# Patient Record
Sex: MALE | Race: WHITE
[De-identification: names, ages, dates, MRNs, and addresses within clinical notes are randomized per-mention and may not be internally consistent; named-entity substitution may affect disease eponyms.]

---

## 2020-11-09 ENCOUNTER — HOSPITAL ENCOUNTER (OUTPATIENT)
Dept: HOSPITAL 92 - BICULT | Age: 35
Discharge: HOME | End: 2020-11-09
Attending: NURSE PRACTITIONER
Payer: COMMERCIAL

## 2020-11-09 DIAGNOSIS — R07.0: Primary | ICD-10-CM

## 2020-11-09 PROCEDURE — 76536 US EXAM OF HEAD AND NECK: CPT

## 2020-11-09 NOTE — ULT
US Thyroid STANDARD



History: Throat pain



Comparison: None.



Findings: Real-time grayscale and color evaluation of the thyroid was performed. Normal echotexture a
nd vascularity. No abnormal thyroid nodule.



Impression: Normal thyroid ultrasound.



Reported By: Toby Arguelles 

Electronically Signed:  11/9/2020 1:56 PM